# Patient Record
Sex: MALE | Race: AMERICAN INDIAN OR ALASKA NATIVE
[De-identification: names, ages, dates, MRNs, and addresses within clinical notes are randomized per-mention and may not be internally consistent; named-entity substitution may affect disease eponyms.]

---

## 2020-02-05 ENCOUNTER — HOSPITAL ENCOUNTER (EMERGENCY)
Dept: HOSPITAL 5 - ED | Age: 50
Discharge: HOME | End: 2020-02-05
Payer: COMMERCIAL

## 2020-02-05 VITALS — SYSTOLIC BLOOD PRESSURE: 135 MMHG | DIASTOLIC BLOOD PRESSURE: 71 MMHG

## 2020-02-05 DIAGNOSIS — Z98.890: ICD-10-CM

## 2020-02-05 DIAGNOSIS — N20.0: Primary | ICD-10-CM

## 2020-02-05 LAB
ALBUMIN SERPL-MCNC: 4.6 G/DL (ref 3.9–5)
ALT SERPL-CCNC: 27 UNITS/L (ref 7–56)
BASOPHILS # (AUTO): 0.1 K/MM3 (ref 0–0.1)
BASOPHILS NFR BLD AUTO: 0.7 % (ref 0–1.8)
BILIRUB UR QL STRIP: (no result)
BLOOD UR QL VISUAL: (no result)
BUN SERPL-MCNC: 15 MG/DL (ref 9–20)
BUN/CREAT SERPL: 11 %
CALCIUM SERPL-MCNC: 9.7 MG/DL (ref 8.4–10.2)
EOSINOPHIL # BLD AUTO: 0 K/MM3 (ref 0–0.4)
EOSINOPHIL NFR BLD AUTO: 0.2 % (ref 0–4.3)
HCT VFR BLD CALC: 45.3 % (ref 35.5–45.6)
HEMOLYSIS INDEX: 9
HGB BLD-MCNC: 15.9 GM/DL (ref 11.8–15.2)
LYMPHOCYTES # BLD AUTO: 1.2 K/MM3 (ref 1.2–5.4)
LYMPHOCYTES NFR BLD AUTO: 13.8 % (ref 13.4–35)
MCHC RBC AUTO-ENTMCNC: 35 % (ref 32–34)
MCV RBC AUTO: 94 FL (ref 84–94)
MONOCYTES # (AUTO): 0.6 K/MM3 (ref 0–0.8)
MONOCYTES % (AUTO): 6.4 % (ref 0–7.3)
MUCOUS THREADS #/AREA URNS HPF: (no result) /HPF
PH UR STRIP: 8 [PH] (ref 5–7)
PLATELET # BLD: 208 K/MM3 (ref 140–440)
RBC # BLD AUTO: 4.8 M/MM3 (ref 3.65–5.03)
RBC #/AREA URNS HPF: 10 /HPF (ref 0–6)
UROBILINOGEN UR-MCNC: < 2 MG/DL (ref ?–2)
WBC #/AREA URNS HPF: 1 /HPF (ref 0–6)

## 2020-02-05 PROCEDURE — 74176 CT ABD & PELVIS W/O CONTRAST: CPT

## 2020-02-05 PROCEDURE — 85025 COMPLETE CBC W/AUTO DIFF WBC: CPT

## 2020-02-05 PROCEDURE — 99284 EMERGENCY DEPT VISIT MOD MDM: CPT

## 2020-02-05 PROCEDURE — 80053 COMPREHEN METABOLIC PANEL: CPT

## 2020-02-05 PROCEDURE — 96361 HYDRATE IV INFUSION ADD-ON: CPT

## 2020-02-05 PROCEDURE — 36415 COLL VENOUS BLD VENIPUNCTURE: CPT

## 2020-02-05 PROCEDURE — 96365 THER/PROPH/DIAG IV INF INIT: CPT

## 2020-02-05 PROCEDURE — 96375 TX/PRO/DX INJ NEW DRUG ADDON: CPT

## 2020-02-05 PROCEDURE — 83690 ASSAY OF LIPASE: CPT

## 2020-02-05 PROCEDURE — 81001 URINALYSIS AUTO W/SCOPE: CPT

## 2020-02-05 NOTE — EMERGENCY DEPARTMENT REPORT
ED Abdominal Pain HPI





- General


Chief Complaint: Abdominal Pain


Stated Complaint: POSS KIDNEY STONE


Time Seen by Provider: 20 20:03


Source: patient


Mode of arrival: Ambulatory


Limitations: No Limitations





- History of Present Illness


Initial Comments: 


 Mr. Rawls is a 49-year-old  male with a history of kidney 

stones who presents with left flank pain radiating to suprapubic 3 days.  There

is no nausea vomiting no fever chills, patient's states intermittent hematuria, 

with frequency, urgency, and dysuria. 


MD Complaint: flank pain


Onset/Timing: 3


-: days(s)


Location: L flank


Radiation: suprapubic


Migration to: no migration


Severity: moderate


Severity scale (0 -10): 5


Quality: sharp


Consistency: constant


Improves With: nothing


Worsens With: movement


Associated Symptoms: nausea, vomiting, dysuria, hematuria.  denies: diarrhea, 

fever, chills





- Related Data


                                  Previous Rx's











 Medication  Instructions  Recorded  Last Taken  Type


 


Ketorolac [Toradol] 10 mg PO Q6H PRN 3 Days #12 tablet 20 Unknown Rx


 


Tamsulosin [Flomax] 0.4 mg PO QDAY #14 cap 20 Unknown Rx


 


levoFLOXacin [Levaquin TAB] 500 mg PO DAILY 10 Days #10 tablet 20 Unknown 

Rx











                                    Allergies











Allergy/AdvReac Type Severity Reaction Status Date / Time


 


No Known Allergies Allergy   Unverified 20 15:41














ED Review of Systems


ROS: 


Stated complaint: POSS KIDNEY STONE


Other details as noted in HPI





Constitutional: denies: chills, fever


Eyes: denies: eye pain, eye discharge, vision change


ENT: denies: ear pain, throat pain


Respiratory: denies: cough, shortness of breath, wheezing


Cardiovascular: denies: chest pain, palpitations


Endocrine: no symptoms reported


Gastrointestinal: abdominal pain, nausea.  denies: vomiting, diarrhea, cons

tipation


Genitourinary: urgency, dysuria, frequency, hematuria.  denies: discharge, 

testicular pain, testicular mass


Musculoskeletal: back pain (left flank pain ).  denies: joint swelling, 

arthralgia


Skin: denies: rash, lesions


Neurological: denies: headache, weakness, paresthesias


Psychiatric: denies: anxiety, depression


Hematological/Lymphatic: denies: easy bleeding, easy bruising





ED Past Medical Hx





- Past Medical History


Previous Medical History?: Yes


Hx Kidney Stones: Yes





- Surgical History


Past Surgical History?: Yes


Additional Surgical History: Hernia.  left foot





- Social History


Smoking Status: Unknown if ever smoked


Substance Use Type: None





- Medications


Home Medications: 


                                Home Medications











 Medication  Instructions  Recorded  Confirmed  Last Taken  Type


 


Ketorolac [Toradol] 10 mg PO Q6H PRN 3 Days #12 tablet 20  Unknown Rx


 


Tamsulosin [Flomax] 0.4 mg PO QDAY #14 cap 20  Unknown Rx


 


levoFLOXacin [Levaquin TAB] 500 mg PO DAILY 10 Days #10 tablet 20  Unknown

Rx














ED Physical Exam





- General


Limitations: No Limitations


General appearance: alert, in no apparent distress





- Head


Head exam: Present: atraumatic, normocephalic





- Eye


Eye exam: Present: normal appearance, PERRL, EOMI





- ENT


ENT exam: Present: mucous membranes moist





- Neck


Neck exam: Present: normal inspection, full ROM.  Absent: tenderness





- Respiratory


Respiratory exam: Present: normal lung sounds bilaterally.  Absent: respiratory 

distress, wheezes, stridor, chest wall tenderness





- Cardiovascular


Cardiovascular Exam: Present: regular rate, normal rhythm, normal heart sounds. 

Absent: systolic murmur, diastolic murmur, rubs, gallop





- GI/Abdominal


GI/Abdominal exam: Present: soft, tenderness (left flank ), normal bowel sounds.

 Absent: distended, guarding, rebound, rigid, bruit, hernia





- Rectal


Rectal exam: Present: deferred





- 


 exam: Present: other (exam deferred )





- Extremities Exam


Extremities exam: Present: normal inspection, full ROM.  Absent: tenderness





- Back Exam


Back exam: Present: full ROM, tenderness, CVA tenderness (L).  Absent: rash n

oted





- Neurological Exam


Neurological exam: Present: alert, oriented X3, CN II-XII intact, normal gait





- Psychiatric


Psychiatric exam: Present: normal affect, normal mood





- Skin


Skin exam: Present: warm, dry, intact, normal color.  Absent: rash





ED Course


                                   Vital Signs











  20





  16:14 22:11


 


Temperature 98.1 F 


 


Pulse Rate 81 


 


Respiratory 18 16





Rate  


 


Blood Pressure 141/82 


 


O2 Sat by Pulse 95 





Oximetry  














ED Medical Decision Making





- Lab Data


Result diagrams: 


                                 20 17:22





                                 20 17:22








Labs











  20





  16:50 17:22 17:22


 


WBC   9.0 


 


RBC   4.80 


 


Hgb   15.9 H 


 


Hct   45.3 


 


MCV   94 


 


MCH   33 H 


 


MCHC   35 H 


 


RDW   13.5 


 


Plt Count   208 


 


Lymph % (Auto)   13.8 


 


Mono % (Auto)   6.4 


 


Eos % (Auto)   0.2 


 


Baso % (Auto)   0.7 


 


Lymph #   1.2 


 


Mono #   0.6 


 


Eos #   0.0 


 


Baso #   0.1 


 


Seg Neutrophils %   78.9 H 


 


Seg Neutrophils #   7.1 


 


Sodium    139


 


Potassium    4.2


 


Chloride    99.4


 


Carbon Dioxide    24


 


Anion Gap    20


 


BUN    15


 


Creatinine    1.4


 


Estimated GFR    > 60


 


BUN/Creatinine Ratio    11


 


Glucose    171 H


 


Calcium    9.7


 


Total Bilirubin    0.60


 


AST    21


 


ALT    27


 


Alkaline Phosphatase    63


 


Total Protein    7.5


 


Albumin    4.6


 


Albumin/Globulin Ratio    1.6


 


Lipase    29


 


Urine Color  Yellow  


 


Urine Turbidity  Clear  


 


Urine pH  8.0 H  


 


Ur Specific Gravity  1.021  


 


Urine Protein  30 mg/dl  


 


Urine Glucose (UA)  Neg  


 


Urine Ketones  Neg  


 


Urine Blood  Sm  


 


Urine Nitrite  Neg  


 


Urine Bilirubin  Neg  


 


Urine Urobilinogen  < 2.0  


 


Ur Leukocyte Esterase  Neg  


 


Urine WBC (Auto)  1.0  


 


Urine RBC (Auto)  10.0  


 


U Epithel Cells (Auto)  < 1.0  


 


Urine Mucus  Few  














- Radiology Data


Radiology results: report reviewed, image reviewed


[place]


[place] [id number]





Cat Scan Report


Signed





Patient: TOMA RAWLS MR#: [id number]


876


: 1970 Acct:[id number]





Age/Sex: 49 / M ADM Date: 20





Loc: ED


Attending Dr:








Ordering Physician: MELLISSA WEBBER NP


Date of Service: 20


Procedure(s): CT abdomen pelvis wo con


Accession Number(s): [id number]





cc: MELLISSA WEBBER NP








CT OF THE ABDOMEN AND PELVIS WITHOUT CONTRAST





INDICATION / CLINICAL INFORMATION:


Left flank pain.





TECHNIQUE:


All CT scans at this location are performed using CT dose reduction for ALARA by

means of automated


exposure control.





COMPARISON:


None available.





FINDINGS:


ABDOMEN: There is mild to moderate left pelvocaliectasis. There is a 1 cm ovoid 

calculus at the


left ureteropelvic junction measuring 1333 Hounsfield units. There is a small 

simple cyst-appearing


lesion in the right mid kidney anteriorly.





The liver, spleen, gallbladder, bile ducts, pancreas, adrenal glands and bowel 

are normal. No


adenopathy is seen. The lung bases are clear.





PELVIS: The distal ureters, urinary bladder and prostate gland are normal. A 

normal appendix is


present and there is no evidence of diverticulitis. No abnormal mass or fluid 

collection is seen. I


do not identify a hernia. There is moderate lower lumbar spondylosis.





IMPRESSION: 1 cm calculus at the left ureteropelvic junction is causing mild to 

moderate


hydronephrosis.











- Medical Decision Making


 CT: 1cm stone left upj with mild to moderate hydronephrosis, plan, rocephin 

ivpb in ed, dc to home with rx for levaquin, toradol, follow up with urology in 

2-3 days, given referral to same. 





Critical care attestation.: 


If time is entered above; I have spent that time in minutes in the direct care 

of this critically ill patient, excluding procedure time.








ED Disposition


Clinical Impression: 


 Kidney stone on left side





Disposition: DC-01 TO HOME OR SELFCARE


Is pt being admited?: No


Does the pt Need Aspirin: No


Condition: Stable


Instructions:  Kidney Stones (ED)


Prescriptions: 


Tamsulosin [Flomax] 0.4 mg PO QDAY #14 cap


levoFLOXacin [Levaquin TAB] 500 mg PO DAILY 10 Days #10 tablet


Ketorolac [Toradol] 10 mg PO Q6H PRN 3 Days #12 tablet


 PRN Reason: Pain


Referrals: 


CARLOS NASH MD [Staff Physician] - 3-5 Days


Forms:  Work/School Release Form(ED)


Time of Disposition: 22:49

## 2020-02-05 NOTE — CAT SCAN REPORT
CT OF THE ABDOMEN AND PELVIS WITHOUT CONTRAST 



INDICATION / CLINICAL INFORMATION:

Left flank pain.



TECHNIQUE:

All CT scans at this location are performed using CT dose reduction for ALARA by means of automated e
xposure control. 



COMPARISON:

None available.



FINDINGS:

ABDOMEN: There is mild to moderate left pelvocaliectasis. There is a 1 cm ovoid calculus at the left 
ureteropelvic junction measuring 1333 Hounsfield units. There is a small simple cyst-appearing lesion
 in the right mid kidney anteriorly.



The liver, spleen, gallbladder, bile ducts, pancreas, adrenal glands and bowel are normal. No adenopa
thy is seen. The lung bases are clear.



PELVIS: The distal ureters, urinary bladder and prostate gland are normal. A normal appendix is prese
nt and there is no evidence of diverticulitis. No abnormal mass or fluid collection is seen. I do not
 identify a hernia. There is moderate lower lumbar spondylosis.



IMPRESSION: 1 cm calculus at the left ureteropelvic junction is causing mild to moderate hydronephros
is. 



Signer Name: Danis Hendricks MD 

Signed: 2/5/2020 10:09 PM

 Workstation Name: [id number]

## 2020-02-10 ENCOUNTER — HOSPITAL ENCOUNTER (OUTPATIENT)
Dept: HOSPITAL 5 - OR | Age: 50
Discharge: HOME | End: 2020-02-10
Attending: UROLOGY
Payer: COMMERCIAL

## 2020-02-10 VITALS — DIASTOLIC BLOOD PRESSURE: 76 MMHG | SYSTOLIC BLOOD PRESSURE: 157 MMHG

## 2020-02-10 DIAGNOSIS — Z71.3: ICD-10-CM

## 2020-02-10 DIAGNOSIS — Z98.890: ICD-10-CM

## 2020-02-10 DIAGNOSIS — G47.30: ICD-10-CM

## 2020-02-10 DIAGNOSIS — N20.2: Primary | ICD-10-CM

## 2020-02-10 DIAGNOSIS — I10: ICD-10-CM

## 2020-02-10 DIAGNOSIS — Z79.899: ICD-10-CM

## 2020-02-10 DIAGNOSIS — E11.9: ICD-10-CM

## 2020-02-10 PROCEDURE — C2617 STENT, NON-COR, TEM W/O DEL: HCPCS

## 2020-02-10 PROCEDURE — 74420 UROGRAPHY RTRGR +-KUB: CPT

## 2020-02-10 PROCEDURE — A4217 STERILE WATER/SALINE, 500 ML: HCPCS

## 2020-02-10 PROCEDURE — 82962 GLUCOSE BLOOD TEST: CPT

## 2020-02-10 PROCEDURE — C1758 CATHETER, URETERAL: HCPCS

## 2020-02-10 PROCEDURE — 52332 CYSTOSCOPY AND TREATMENT: CPT

## 2020-02-10 PROCEDURE — C1769 GUIDE WIRE: HCPCS

## 2020-02-10 NOTE — OPERATIVE REPORT
PREOPERATIVE DIAGNOSIS:  Left proximal ureteral stone, 10 mm.



POSTOPERATIVE DIAGNOSES:  Left proximal ureteral stone, 10 mm, impacted stone

with ureteral stricture.



PROCEDURE:  Cystoscopy, bilateral retrograde pyelograms, left ureteroscopy,

stent, staged procedure.



SURGEON:  Lev Ba M.D.



ANESTHESIA:  General.



ESTIMATED BLOOD LOSS:  Minimal.



FLUIDS:  Crystalloid.



COMPLICATIONS:  No complications.



INDICATIONS:  This patient is a 49-year-old gentleman seen in the office with

left flank pain.  CT of abdomen and pelvis revealed a 10 mm proximal ureteral

stone.  Discussed options.  He agreed to proceed with surgical intervention. 

The patient traveled.  He needed to travel to [place], but now presents for

intervention.  Risks, benefits, and complications were explained to the patient

and his wife, long discussion.  All questions answered.



DESCRIPTION OF PROCEDURE:  The patient was taken to the operative suite, placed

in a supine position.  After adequate general anesthesia, placed in a dorsal

lithotomy position, prepped and draped in a sterile fashion. 

Pancystourethroscopy was performed with 22-Amharic Storz cystoscope, no urethral

abnormalities.  The patient had some mild trilobar obstruction.  His bladder, no

tumors or stones were noted.  Both ureteral orifices in normal position.  

film revealed a 10 mm stone in the renal silhouette.  Bilateral retrograde

pyelograms were obtained with an 8-Amharic [place] catheter and 8 mL of

contrast.  No filling defects or obstruction on the right.  Left side, obvious

filling defect in the proximal ureter.  Two 0.035 Glidewires were placed.  Rigid

ureteroscopy could get to the mid ureter and was noted to have a stricture.  At

that point, I changed to a flexible ureteroscope to get past the stricture;

however, the scope could not be advanced pass that area.  At this point, a

6-Amharic 26 cm double-J stent was placed under fluoroscopic guidance with a

short internal string with hopes of passive dilation and at least to facilitate

passage of the stone fragments via lithotripsy in the future.  He was extubated.

 _____ exam was benign, taken to recovery room.  He will go home on Macrobid,

Ultram and Norco.





DD: 02/10/2020 08:51

DT: 02/10/2020 09:02

JOB# [id number]  [id number]

C/NTS

## 2020-02-10 NOTE — SHORT STAY SUMMARY
Short Stay Documentation


Date of service: 02/10/20





- History


H&P: obtained from office





- Allergies and Medications


Current Medications: 


                                    Allergies





No Known Allergies Allergy (Verified 02/06/20 17:31)


   





                                Home Medications











 Medication  Instructions  Recorded  Confirmed  Last Taken  Type


 


Ketorolac [Toradol] 10 mg PO Q6H PRN 3 Days #12 tablet 02/05/20 02/10/20 

02/10/20 03:00 Rx


 


HYDROcodone/APAP 5-325 5 - 325 mg PO Q6HR 02/10/20 02/10/20 02/09/20 19:00 

History








Active Medications





Cefazolin Sodium (Ancef/Sterile Water 2 Gm/20 Ml)  2 gm IV PREOP NR


   Stop: 02/10/20 23:45


Fentanyl (Sublimaze)  50 mcg IV Q5MIN PRN


   PRN Reason: Pain , Severe (7-10)


   Stop: 02/10/20 23:00


Lactated Ringer's (Lactated Ringers)  1,000 mls @ 100 mls/hr IV AS DIRECT DAYAN


   Last Admin: 02/10/20 06:55 Dose:  100 mls/hr


   Documented by: 


Magnesium Oxide (Mag-Ox)  400 mg PO ONCE NR


   Stop: 02/10/20 09:00


   Last Admin: 02/10/20 07:20 Dose:  400 mg


   Documented by: 


Midazolam HCl (Versed)  2 mg IV PREOP NR


   Stop: 02/10/20 23:59


   Last Admin: 02/10/20 07:35 Dose:  2 mg


   Documented by: 


Ondansetron HCl (Zofran)  4 mg IV ONCE PRN


   PRN Reason: Nausea And Vomiting


   Stop: 02/10/20 16:00











- Brief post op/procedure progress note


Date of procedure: 02/10/20


Pre-op diagnosis: left prox ureteral stone


Post-op diagnosis: same (impacted, stricture)


Procedure: 





cysto, rpg, left ureteroscopy, stent (short internal string)


Anesthesia: GETA


Surgeon: CARLOS NASH


Estimated blood loss: minimal


Condition: stable





- Hospital course


Hospital course: 








macrobid, ultram, norco & post op info on chart





- Disposition


Condition at discharge: Stable


Disposition: DC-01 TO HOME OR SELFCARE





Short Stay Discharge Plan


Follow up with: 


PRIMARY CARE,MD [Primary Care Provider] - 7 Days

## 2020-02-10 NOTE — FLUOROSCOPY REPORT
Retrograde urography.



HISTORY: Renal stone.



FINDINGS: Obstruction of both renal collecting system was performed. A left sided stent was placed.



Fluoroscopy time: 47 seconds. 9 fluoroscopic images were obtained.



Signer Name: Omar Yung MD 

Signed: 2/10/2020 11:20 AM

 Workstation Name: [id number]

## 2020-02-10 NOTE — ANESTHESIA CONSULTATION
Anesthesia Consult and Med Hx


Date of service: 02/10/20





- Airway


Anesthetic Teeth Evaluation: Good, Caps


ROM Head & Neck: Adequate


Mental/Hyoid Distance: Adequate


Mallampati Class: Class III


Intubation Access Assessment: Possibly Difficult





- Pre-Operative Health Status


ASA Pre-Surgery Classification: ASA3


Proposed Anesthetic Plan: General





- Pulmonary


Hx Smoking: No (Pt states he can climb two flights of stairs)


Hx Sleep Apnea: Yes (DX SLEEP APNEA WITH CPAP USE.)





- Cardiovascular System


Hx Hypertension: Yes (NO MEDS AT PRESENT)





- Endocrine


Hx Non-Insulin Dependent Diabetes: Yes (PRE)





- Other Systems


Hx Cancer: No

## 2020-03-27 ENCOUNTER — HOSPITAL ENCOUNTER (OUTPATIENT)
Dept: HOSPITAL 5 - XRAY | Age: 50
Discharge: HOME | End: 2020-03-27
Attending: UROLOGY
Payer: COMMERCIAL

## 2020-03-27 DIAGNOSIS — N20.0: Primary | ICD-10-CM

## 2020-03-27 PROCEDURE — 74018 RADEX ABDOMEN 1 VIEW: CPT

## 2020-03-27 NOTE — XRAY REPORT
ABDOMEN 1 VIEW(S)



INDICATION / CLINICAL INFORMATION:  N20.0 CALCULUS OF KIDNEY.



COMPARISON:  None available.



FINDINGS:



TUBES / LINES: None.

BOWEL GAS PATTERN: No significant abnormality. 

FREE AIR / EXTRALUMINAL GAS: None seen.



ADDITIONAL FINDINGS: A left ureteral stent appears in good position. Calcification at the inferior po
le of the left kidney consistent with a stone measures 1.5 x 0.8 cm. No other calcifications are iden
tified.



IMPRESSION:

Left nephrolithiasis as described.



Signer Name: Cas Pacheco Jr, MD 

Signed: 3/27/2020 2:41 PM

Workstation Name: [id number]